# Patient Record
Sex: MALE | Race: WHITE | NOT HISPANIC OR LATINO
[De-identification: names, ages, dates, MRNs, and addresses within clinical notes are randomized per-mention and may not be internally consistent; named-entity substitution may affect disease eponyms.]

---

## 2023-10-30 ENCOUNTER — DASHBOARD ENCOUNTERS (OUTPATIENT)
Age: 43
End: 2023-10-30

## 2023-10-30 ENCOUNTER — LAB OUTSIDE AN ENCOUNTER (OUTPATIENT)
Dept: URBAN - METROPOLITAN AREA CLINIC 40 | Facility: CLINIC | Age: 43
End: 2023-10-30

## 2023-10-30 ENCOUNTER — OFFICE VISIT (OUTPATIENT)
Dept: URBAN - METROPOLITAN AREA CLINIC 40 | Facility: CLINIC | Age: 43
End: 2023-10-30
Payer: COMMERCIAL

## 2023-10-30 VITALS
HEART RATE: 78 BPM | HEIGHT: 72 IN | DIASTOLIC BLOOD PRESSURE: 82 MMHG | TEMPERATURE: 97.7 F | BODY MASS INDEX: 27.44 KG/M2 | WEIGHT: 202.6 LBS | SYSTOLIC BLOOD PRESSURE: 126 MMHG

## 2023-10-30 DIAGNOSIS — K62.5 RECTAL BLEEDING: ICD-10-CM

## 2023-10-30 DIAGNOSIS — Z86.19: ICD-10-CM

## 2023-10-30 PROCEDURE — 99203 OFFICE O/P NEW LOW 30 MIN: CPT | Performed by: INTERNAL MEDICINE

## 2023-10-30 RX ORDER — SUMATRIPTAN SUCCINATE 25 MG/1
TAKE ONE TABLET BY MOUTH AT ONSET OF HEADACHE. MAY REPEAT DOSE WITH ONE TABLET IN TWO HOURS IF NEEDED. DO NOT EXCEED TWO TABLETS IN 24 HOURS TABLET ORAL
Qty: 4 UNSPECIFIED | Refills: 0 | Status: ON HOLD | COMMUNITY

## 2023-10-30 RX ORDER — POLYETHYLENE GLYCOL 3350, SODIUM SULFATE, SODIUM CHLORIDE, POTASSIUM CHLORIDE, ASCORBIC ACID, SODIUM ASCORBATE 140-9-5.2G
AS DIRECTED KIT ORAL ONCE
Qty: 1 KIT | Refills: 0 | OUTPATIENT
Start: 2023-10-30 | End: 2023-10-31

## 2023-10-30 RX ORDER — MESALAMINE 500 MG/1
TAKE 4 CAPSULES (1,000 MG) BY ORAL ROUTE 2 TIMES PER DAY CAPSULE ORAL
Qty: 240 | Refills: 6 | Status: ON HOLD | COMMUNITY
Start: 2017-05-05

## 2023-10-30 RX ORDER — FINASTERIDE 1 MG
TAKE 1 TABLET (1 MG) BY ORAL ROUTE ONCE DAILY TABLET ORAL 1
Qty: 0 | Refills: 0 | Status: ON HOLD | COMMUNITY
Start: 1900-01-01

## 2023-10-30 NOTE — HPI-TODAY'S VISIT:
Pt seen by Dr. Mc Craig 2017, no f/u since then. Colonoscopy 2017, very mild ileal erythema, bx neg for overt IBD, mild eosiniphilic infiltration. [facility] IBD panel NEGATIVE 2017. EGD 2017, benign duodenal nodule, path neg for H.pylori and Celiac Ds. Rx: Pentasa for ileiitis 2017. He has been doing great since then, no GI s/s, off all meds. He would to schedule a colonoscopy now before end of the year, deductible met. Very rare wipe bleeding.

## 2023-11-28 ENCOUNTER — TELEPHONE ENCOUNTER (OUTPATIENT)
Dept: URBAN - METROPOLITAN AREA CLINIC 40 | Facility: CLINIC | Age: 43
End: 2023-11-28

## 2023-12-21 ENCOUNTER — OFFICE VISIT (OUTPATIENT)
Dept: URBAN - METROPOLITAN AREA SURGERY CENTER 30 | Facility: SURGERY CENTER | Age: 43
End: 2023-12-21

## 2023-12-21 ENCOUNTER — OUT OF OFFICE VISIT (OUTPATIENT)
Dept: URBAN - METROPOLITAN AREA SURGERY CENTER 30 | Facility: SURGERY CENTER | Age: 43
End: 2023-12-21
Payer: COMMERCIAL

## 2023-12-21 DIAGNOSIS — Z12.11 COLON CANCER SCREENING: ICD-10-CM

## 2023-12-21 PROCEDURE — 00812 ANES LWR INTST SCR COLSC: CPT | Performed by: NURSE ANESTHETIST, CERTIFIED REGISTERED

## 2023-12-21 PROCEDURE — G0121 COLON CA SCRN NOT HI RSK IND: HCPCS | Performed by: INTERNAL MEDICINE

## 2023-12-21 PROCEDURE — G8907 PT DOC NO EVENTS ON DISCHARG: HCPCS | Performed by: INTERNAL MEDICINE

## 2024-01-25 ENCOUNTER — WEB ENCOUNTER (OUTPATIENT)
Dept: URBAN - METROPOLITAN AREA CLINIC 40 | Facility: CLINIC | Age: 44
End: 2024-01-25

## 2024-01-30 ENCOUNTER — OFFICE VISIT (OUTPATIENT)
Dept: URBAN - METROPOLITAN AREA CLINIC 40 | Facility: CLINIC | Age: 44
End: 2024-01-30